# Patient Record
Sex: FEMALE | Race: WHITE | Employment: FULL TIME | URBAN - METROPOLITAN AREA
[De-identification: names, ages, dates, MRNs, and addresses within clinical notes are randomized per-mention and may not be internally consistent; named-entity substitution may affect disease eponyms.]

---

## 2017-05-23 PROBLEM — K21.9 GASTROESOPHAGEAL REFLUX DISEASE, ESOPHAGITIS PRESENCE NOT SPECIFIED: Status: ACTIVE | Noted: 2017-05-23

## 2018-03-20 PROCEDURE — 87624 HPV HI-RISK TYP POOLED RSLT: CPT | Performed by: FAMILY MEDICINE

## 2018-03-20 PROCEDURE — 88175 CYTOPATH C/V AUTO FLUID REDO: CPT | Performed by: FAMILY MEDICINE

## 2018-03-29 PROCEDURE — 87491 CHLMYD TRACH DNA AMP PROBE: CPT | Performed by: FAMILY MEDICINE

## 2018-03-29 PROCEDURE — 80074 ACUTE HEPATITIS PANEL: CPT | Performed by: FAMILY MEDICINE

## 2018-03-29 PROCEDURE — 86780 TREPONEMA PALLIDUM: CPT | Performed by: FAMILY MEDICINE

## 2018-03-29 PROCEDURE — 87591 N.GONORRHOEAE DNA AMP PROB: CPT | Performed by: FAMILY MEDICINE

## 2018-03-29 PROCEDURE — 87389 HIV-1 AG W/HIV-1&-2 AB AG IA: CPT | Performed by: FAMILY MEDICINE

## 2018-03-29 PROCEDURE — 36415 COLL VENOUS BLD VENIPUNCTURE: CPT | Performed by: FAMILY MEDICINE

## 2018-08-22 PROCEDURE — 86618 LYME DISEASE ANTIBODY: CPT | Performed by: FAMILY MEDICINE

## 2018-08-22 PROCEDURE — 84425 ASSAY OF VITAMIN B-1: CPT | Performed by: FAMILY MEDICINE

## 2019-02-25 ENCOUNTER — APPOINTMENT (OUTPATIENT)
Dept: GENERAL RADIOLOGY | Facility: HOSPITAL | Age: 34
End: 2019-02-25
Attending: EMERGENCY MEDICINE
Payer: COMMERCIAL

## 2019-02-25 ENCOUNTER — HOSPITAL ENCOUNTER (EMERGENCY)
Facility: HOSPITAL | Age: 34
Discharge: HOME | End: 2019-02-25
Attending: EMERGENCY MEDICINE
Payer: COMMERCIAL

## 2019-02-25 VITALS
SYSTOLIC BLOOD PRESSURE: 136 MMHG | HEART RATE: 88 BPM | BODY MASS INDEX: 29.55 KG/M2 | DIASTOLIC BLOOD PRESSURE: 96 MMHG | WEIGHT: 195 LBS | RESPIRATION RATE: 15 BRPM | OXYGEN SATURATION: 99 % | HEIGHT: 68 IN | TEMPERATURE: 98 F

## 2019-02-25 DIAGNOSIS — I15.9 SECONDARY HYPERTENSION: ICD-10-CM

## 2019-02-25 DIAGNOSIS — R07.9 CHEST PAIN OF UNCERTAIN ETIOLOGY: Primary | ICD-10-CM

## 2019-02-25 LAB
ALBUMIN SERPL-MCNC: 3.8 G/DL (ref 3.4–5)
ALBUMIN/GLOB SERPL: 1.1 {RATIO} (ref 1–2)
ALP LIVER SERPL-CCNC: 115 U/L (ref 37–98)
ALT SERPL-CCNC: 19 U/L (ref 13–56)
ANION GAP SERPL CALC-SCNC: 9 MMOL/L (ref 0–18)
AST SERPL-CCNC: 16 U/L (ref 15–37)
BASOPHILS # BLD AUTO: 0.04 X10(3) UL (ref 0–0.2)
BASOPHILS NFR BLD AUTO: 0.6 %
BILIRUB SERPL-MCNC: 0.2 MG/DL (ref 0.1–2)
BUN BLD-MCNC: 11 MG/DL (ref 7–18)
BUN/CREAT SERPL: 13.4 (ref 10–20)
CALCIUM BLD-MCNC: 9.6 MG/DL (ref 8.5–10.1)
CHLORIDE SERPL-SCNC: 107 MMOL/L (ref 98–107)
CO2 SERPL-SCNC: 24 MMOL/L (ref 21–32)
CREAT BLD-MCNC: 0.82 MG/DL (ref 0.55–1.02)
D-DIMER: 0.45 UG/ML FEU (ref 0–0.49)
DEPRECATED RDW RBC AUTO: 39.9 FL (ref 35.1–46.3)
EOSINOPHIL # BLD AUTO: 0.07 X10(3) UL (ref 0–0.7)
EOSINOPHIL NFR BLD AUTO: 1 %
ERYTHROCYTE [DISTWIDTH] IN BLOOD BY AUTOMATED COUNT: 11.6 % (ref 11–15)
GLOBULIN PLAS-MCNC: 3.5 G/DL (ref 2.8–4.4)
GLUCOSE BLD-MCNC: 107 MG/DL (ref 70–99)
HCT VFR BLD AUTO: 42.2 % (ref 35–48)
HGB BLD-MCNC: 14.4 G/DL (ref 12–16)
IMM GRANULOCYTES # BLD AUTO: 0.01 X10(3) UL (ref 0–1)
IMM GRANULOCYTES NFR BLD: 0.1 %
LYMPHOCYTES # BLD AUTO: 2.07 X10(3) UL (ref 1–4)
LYMPHOCYTES NFR BLD AUTO: 28.6 %
M PROTEIN MFR SERPL ELPH: 7.3 G/DL (ref 6.4–8.2)
MCH RBC QN AUTO: 32.3 PG (ref 26–34)
MCHC RBC AUTO-ENTMCNC: 34.1 G/DL (ref 31–37)
MCV RBC AUTO: 94.6 FL (ref 80–100)
MONOCYTES # BLD AUTO: 0.35 X10(3) UL (ref 0.1–1)
MONOCYTES NFR BLD AUTO: 4.8 %
NEUTROPHILS # BLD AUTO: 4.7 X10 (3) UL (ref 1.5–7.7)
NEUTROPHILS # BLD AUTO: 4.7 X10(3) UL (ref 1.5–7.7)
NEUTROPHILS NFR BLD AUTO: 64.9 %
OSMOLALITY SERPL CALC.SUM OF ELEC: 290 MOSM/KG (ref 275–295)
PLATELET # BLD AUTO: 269 10(3)UL (ref 150–450)
POTASSIUM SERPL-SCNC: 3.8 MMOL/L (ref 3.5–5.1)
RBC # BLD AUTO: 4.46 X10(6)UL (ref 3.8–5.3)
SODIUM SERPL-SCNC: 140 MMOL/L (ref 136–145)
TROPONIN I SERPL-MCNC: <0.045 NG/ML (ref ?–0.04)
TROPONIN I SERPL-MCNC: <0.045 NG/ML (ref ?–0.04)
WBC # BLD AUTO: 7.2 X10(3) UL (ref 4–11)

## 2019-02-25 PROCEDURE — 85378 FIBRIN DEGRADE SEMIQUANT: CPT | Performed by: EMERGENCY MEDICINE

## 2019-02-25 PROCEDURE — 99285 EMERGENCY DEPT VISIT HI MDM: CPT

## 2019-02-25 PROCEDURE — 93010 ELECTROCARDIOGRAM REPORT: CPT

## 2019-02-25 PROCEDURE — 84484 ASSAY OF TROPONIN QUANT: CPT | Performed by: EMERGENCY MEDICINE

## 2019-02-25 PROCEDURE — 36415 COLL VENOUS BLD VENIPUNCTURE: CPT

## 2019-02-25 PROCEDURE — 80053 COMPREHEN METABOLIC PANEL: CPT | Performed by: EMERGENCY MEDICINE

## 2019-02-25 PROCEDURE — 93005 ELECTROCARDIOGRAM TRACING: CPT

## 2019-02-25 PROCEDURE — 85025 COMPLETE CBC W/AUTO DIFF WBC: CPT | Performed by: EMERGENCY MEDICINE

## 2019-02-25 PROCEDURE — 71045 X-RAY EXAM CHEST 1 VIEW: CPT | Performed by: EMERGENCY MEDICINE

## 2019-02-25 RX ORDER — PANTOPRAZOLE SODIUM 40 MG/1
40 TABLET, DELAYED RELEASE ORAL DAILY
Qty: 30 TABLET | Refills: 0 | Status: SHIPPED | OUTPATIENT
Start: 2019-02-25 | End: 2019-03-27

## 2019-02-25 RX ORDER — ONDANSETRON 2 MG/ML
4 INJECTION INTRAMUSCULAR; INTRAVENOUS ONCE
Status: DISCONTINUED | OUTPATIENT
Start: 2019-02-25 | End: 2019-02-25

## 2019-02-25 RX ORDER — KETOROLAC TROMETHAMINE 30 MG/ML
30 INJECTION, SOLUTION INTRAMUSCULAR; INTRAVENOUS ONCE
Status: DISCONTINUED | OUTPATIENT
Start: 2019-02-25 | End: 2019-02-25

## 2019-02-25 RX ORDER — METOPROLOL SUCCINATE 25 MG/1
25 TABLET, EXTENDED RELEASE ORAL DAILY
Qty: 30 TABLET | Refills: 0 | Status: SHIPPED | OUTPATIENT
Start: 2019-02-25 | End: 2019-03-27

## 2019-02-25 NOTE — ED PROVIDER NOTES
Patient Seen in: [facility] Emergency Department    History   Patient presents with:  Chest Pain Angina (cardiovascular)    Stated Complaint: left chest pain    HPI  This is a 60-year-old female who has a complaints of pain that started 1 hour and a Physical Exam  General: . Patient is in no respiratory distress. The patient is in no respiratory distress. The patient is not septic or toxic    HEENT: Atraumatic, conjunctiva are not pale. There is no icterus. Oral mucosa Is wet.   No facial t WITH PLATELET    Narrative: The following orders were created for panel order CBC WITH DIFFERENTIAL WITH PLATELET.   Procedure                               Abnormality         Status                     ---------                               --------- shortness of breath.   The patient states that she walks a lot and has no chest pain with activity she has no family history 2 EKGs were normal the troponin repeat troponin was done which at time dictation was pending if the troponin second troponin is nega

## 2019-02-25 NOTE — ED NOTES
Pt declined Toradol and zofran at this time. Pt states her symptoms are intermittent. No pain at this time.

## 2019-02-25 NOTE — ED INITIAL ASSESSMENT (HPI)
Pt to ER left upper chest pain today. Pt took 325 mg ASA at home. Pain 4/10, had nitro x 1 en-route and pain was resolved. Michel ODT was given en-route. Pt reports discomfort to left chest now. Pt is tearful and anxious.

## 2019-02-26 LAB
ATRIAL RATE: 101 BPM
ATRIAL RATE: 95 BPM
P AXIS: 21 DEGREES
P AXIS: 51 DEGREES
P-R INTERVAL: 150 MS
P-R INTERVAL: 152 MS
Q-T INTERVAL: 352 MS
Q-T INTERVAL: 354 MS
QRS DURATION: 76 MS
QRS DURATION: 76 MS
QTC CALCULATION (BEZET): 444 MS
QTC CALCULATION (BEZET): 456 MS
R AXIS: 0 DEGREES
R AXIS: 1 DEGREES
T AXIS: 34 DEGREES
T AXIS: 41 DEGREES
VENTRICULAR RATE: 101 BPM
VENTRICULAR RATE: 95 BPM

## 2019-07-29 ENCOUNTER — OFFICE VISIT (OUTPATIENT)
Dept: FAMILY MEDICINE CLINIC | Facility: CLINIC | Age: 34
End: 2019-07-29
Payer: COMMERCIAL

## 2019-07-29 VITALS
BODY MASS INDEX: 29.5 KG/M2 | HEIGHT: 68 IN | WEIGHT: 194.63 LBS | RESPIRATION RATE: 16 BRPM | TEMPERATURE: 98 F | OXYGEN SATURATION: 98 % | DIASTOLIC BLOOD PRESSURE: 74 MMHG | SYSTOLIC BLOOD PRESSURE: 122 MMHG | HEART RATE: 78 BPM

## 2019-07-29 DIAGNOSIS — J02.9 SORE THROAT: Primary | ICD-10-CM

## 2019-07-29 LAB
CONTROL LINE PRESENT WITH A CLEAR BACKGROUND (YES/NO): YES YES/NO
STREP GRP A CUL-SCR: NEGATIVE

## 2019-07-29 PROCEDURE — 87880 STREP A ASSAY W/OPTIC: CPT | Performed by: FAMILY MEDICINE

## 2019-07-29 PROCEDURE — 99202 OFFICE O/P NEW SF 15 MIN: CPT | Performed by: FAMILY MEDICINE

## 2019-07-30 NOTE — PATIENT INSTRUCTIONS
Take claritin/zyrtec once daily. Ibuprofen for pain. If no better in 2-3 days, follow-up for further evaluation.

## 2019-07-30 NOTE — PROGRESS NOTES
CHIEF COMPLAINT:   Patient presents with:  Sore Throat: x 4 days         HPI:   Billy Barbosa is a 36year old female presents to clinic with complaint of sore throat. Patient has had for 4 days.  Patient denies congestion, cough, fever, headache, nause apparent distress  SKIN: no rashes,no suspicious lesions  HEAD: atraumatic, normocephalic  EYES: conjunctivae clear, EOM intact  EARS: TM's clear, non-injected, no bulging, retraction, or fluid bilaterally  NOSE: nostrils patent, no exudates, nasal mucosa

## 2019-09-17 PROCEDURE — 87045 FECES CULTURE AEROBIC BACT: CPT | Performed by: EMERGENCY MEDICINE

## 2019-09-17 PROCEDURE — 87493 C DIFF AMPLIFIED PROBE: CPT | Performed by: EMERGENCY MEDICINE

## 2019-09-17 PROCEDURE — 87046 STOOL CULTR AEROBIC BACT EA: CPT | Performed by: EMERGENCY MEDICINE

## 2020-06-11 PROBLEM — I10 ESSENTIAL HYPERTENSION: Status: ACTIVE | Noted: 2020-06-11

## 2020-12-15 PROBLEM — F41.0 PANIC DISORDER: Status: ACTIVE | Noted: 2020-12-15

## 2020-12-15 PROBLEM — Z63.9 RELATIONSHIP PROBLEMS: Status: ACTIVE | Noted: 2020-12-15

## 2020-12-16 PROBLEM — F33.0 MILD EPISODE OF RECURRENT MAJOR DEPRESSIVE DISORDER (HCC): Status: ACTIVE | Noted: 2020-12-16

## 2020-12-16 PROBLEM — F41.1 GENERALIZED ANXIETY DISORDER: Status: ACTIVE | Noted: 2020-12-16

## 2020-12-16 PROBLEM — F33.0 MILD EPISODE OF RECURRENT MAJOR DEPRESSIVE DISORDER: Status: ACTIVE | Noted: 2020-12-16

## 2020-12-16 PROBLEM — F43.9 STRESS: Status: ACTIVE | Noted: 2020-12-16

## 2021-04-14 PROCEDURE — 81001 URINALYSIS AUTO W/SCOPE: CPT | Performed by: STUDENT IN AN ORGANIZED HEALTH CARE EDUCATION/TRAINING PROGRAM

## 2021-04-14 PROCEDURE — 99285 EMERGENCY DEPT VISIT HI MDM: CPT

## 2021-04-14 PROCEDURE — 96360 HYDRATION IV INFUSION INIT: CPT

## 2021-04-14 PROCEDURE — 81025 URINE PREGNANCY TEST: CPT

## 2021-04-14 PROCEDURE — 81001 URINALYSIS AUTO W/SCOPE: CPT

## 2021-04-14 PROCEDURE — 93010 ELECTROCARDIOGRAM REPORT: CPT

## 2021-04-15 ENCOUNTER — APPOINTMENT (OUTPATIENT)
Dept: CT IMAGING | Facility: HOSPITAL | Age: 36
End: 2021-04-15
Attending: STUDENT IN AN ORGANIZED HEALTH CARE EDUCATION/TRAINING PROGRAM
Payer: COMMERCIAL

## 2021-04-15 ENCOUNTER — HOSPITAL ENCOUNTER (EMERGENCY)
Facility: HOSPITAL | Age: 36
Discharge: HOME | End: 2021-04-15
Attending: STUDENT IN AN ORGANIZED HEALTH CARE EDUCATION/TRAINING PROGRAM
Payer: COMMERCIAL

## 2021-04-15 VITALS
DIASTOLIC BLOOD PRESSURE: 97 MMHG | OXYGEN SATURATION: 99 % | WEIGHT: 185 LBS | RESPIRATION RATE: 17 BRPM | TEMPERATURE: 97 F | HEART RATE: 84 BPM | BODY MASS INDEX: 28.04 KG/M2 | HEIGHT: 68 IN | SYSTOLIC BLOOD PRESSURE: 132 MMHG

## 2021-04-15 DIAGNOSIS — I10 HYPERTENSION, UNSPECIFIED TYPE: ICD-10-CM

## 2021-04-15 DIAGNOSIS — R11.0 NAUSEA: ICD-10-CM

## 2021-04-15 DIAGNOSIS — R51.9 ACUTE NONINTRACTABLE HEADACHE, UNSPECIFIED HEADACHE TYPE: Primary | ICD-10-CM

## 2021-04-15 PROCEDURE — 85025 COMPLETE CBC W/AUTO DIFF WBC: CPT | Performed by: STUDENT IN AN ORGANIZED HEALTH CARE EDUCATION/TRAINING PROGRAM

## 2021-04-15 PROCEDURE — 93005 ELECTROCARDIOGRAM TRACING: CPT

## 2021-04-15 PROCEDURE — 84484 ASSAY OF TROPONIN QUANT: CPT | Performed by: STUDENT IN AN ORGANIZED HEALTH CARE EDUCATION/TRAINING PROGRAM

## 2021-04-15 PROCEDURE — 80053 COMPREHEN METABOLIC PANEL: CPT | Performed by: STUDENT IN AN ORGANIZED HEALTH CARE EDUCATION/TRAINING PROGRAM

## 2021-04-15 PROCEDURE — 70450 CT HEAD/BRAIN W/O DYE: CPT | Performed by: STUDENT IN AN ORGANIZED HEALTH CARE EDUCATION/TRAINING PROGRAM

## 2021-04-15 RX ORDER — ONDANSETRON 4 MG/1
TABLET, ORALLY DISINTEGRATING ORAL
Status: COMPLETED
Start: 2021-04-15 | End: 2021-04-15

## 2021-04-15 RX ORDER — ACETAMINOPHEN 500 MG
1000 TABLET ORAL ONCE
Status: COMPLETED | OUTPATIENT
Start: 2021-04-15 | End: 2021-04-15

## 2021-04-15 RX ORDER — ONDANSETRON 2 MG/ML
INJECTION INTRAMUSCULAR; INTRAVENOUS
Status: DISCONTINUED
Start: 2021-04-15 | End: 2021-04-15 | Stop reason: WASHOUT

## 2021-04-15 RX ORDER — ONDANSETRON 4 MG/1
4 TABLET, ORALLY DISINTEGRATING ORAL ONCE
Status: COMPLETED | OUTPATIENT
Start: 2021-04-15 | End: 2021-04-15

## 2021-04-15 NOTE — ED PROVIDER NOTES
Patient Seen in: [facility] Emergency Department      History   Patient presents with:  Headache  Hypertension    Stated Complaint: htn 135/102, headache     HPI/Subjective:   HPI    Patient is a 36-year-old female who presents emergency department Resp 20   Ht 172.7 cm (5' 8\")   Wt 83.9 kg   LMP 03/31/2021   SpO2 99%   BMI 28.13 kg/m²         Physical Exam    Constitutional: The patient is oriented to person, place, and time, appears well-developed and well-nourished. HENT:   Head: Normocephalic. -----------         ------                     CBC W/ DIFFERENTIAL[[id number]]          Abnormal            Final result                 Please view results for these tests on the individual orders.      EKG    Rate, intervals and axes as noted on (primary encounter diagnosis)  Hypertension, unspecified type  Nausea     Disposition:  Discharge  4/15/2021  2:14 am    Follow-up:  Jerry Melton, [address]  [phone]    In 1 day            Medications Prescribed:

## 2021-04-15 NOTE — ED INITIAL ASSESSMENT (HPI)
Pt states having headache x 2 hours. Pt states her BP was high at 135/102. Pt states taking regular BP medication at 1600 and 2100, with no relief of headache.

## 2025-07-16 ENCOUNTER — APPOINTMENT (OUTPATIENT)
Dept: CT IMAGING | Facility: HOSPITAL | Age: 40
End: 2025-07-16
Attending: EMERGENCY MEDICINE
Payer: COMMERCIAL

## 2025-07-16 ENCOUNTER — HOSPITAL ENCOUNTER (EMERGENCY)
Facility: HOSPITAL | Age: 40
Discharge: HOME | End: 2025-07-16
Attending: EMERGENCY MEDICINE
Payer: COMMERCIAL

## 2025-07-16 VITALS
OXYGEN SATURATION: 100 % | WEIGHT: 195 LBS | TEMPERATURE: 98 F | BODY MASS INDEX: 29.55 KG/M2 | DIASTOLIC BLOOD PRESSURE: 90 MMHG | HEART RATE: 78 BPM | SYSTOLIC BLOOD PRESSURE: 132 MMHG | RESPIRATION RATE: 19 BRPM | HEIGHT: 68 IN

## 2025-07-16 DIAGNOSIS — F41.9 ANXIETY: ICD-10-CM

## 2025-07-16 DIAGNOSIS — R00.2 PALPITATIONS: Primary | ICD-10-CM

## 2025-07-16 LAB
ALBUMIN SERPL-MCNC: 4.7 G/DL (ref 3.2–4.8)
ALBUMIN/GLOB SERPL: 1.6 {RATIO} (ref 1–2)
ALP LIVER SERPL-CCNC: 95 U/L (ref 37–98)
ALT SERPL-CCNC: 17 U/L (ref 10–49)
ANION GAP SERPL CALC-SCNC: 8 MMOL/L (ref 0–18)
AST SERPL-CCNC: 32 U/L (ref ?–34)
ATRIAL RATE: 120 BPM
B-HCG UR QL: NEGATIVE
BASOPHILS # BLD AUTO: 0.04 X10(3) UL (ref 0–0.2)
BASOPHILS NFR BLD AUTO: 0.3 %
BILIRUB SERPL-MCNC: 0.4 MG/DL (ref 0.3–1.2)
BILIRUB UR QL STRIP.AUTO: NEGATIVE
BUN BLD-MCNC: 13 MG/DL (ref 9–23)
CALCIUM BLD-MCNC: 9.6 MG/DL (ref 8.7–10.6)
CHLORIDE SERPL-SCNC: 106 MMOL/L (ref 98–112)
CLARITY UR REFRACT.AUTO: CLEAR
CO2 SERPL-SCNC: 24 MMOL/L (ref 21–32)
COLOR UR AUTO: COLORLESS
CREAT BLD-MCNC: 0.87 MG/DL (ref 0.55–1.02)
D DIMER PPP FEU-MCNC: 0.87 UG/ML FEU (ref ?–0.5)
EGFRCR SERPLBLD CKD-EPI 2021: 87 ML/MIN/1.73M2 (ref 60–?)
EOSINOPHIL # BLD AUTO: 0.02 X10(3) UL (ref 0–0.7)
EOSINOPHIL NFR BLD AUTO: 0.2 %
ERYTHROCYTE [DISTWIDTH] IN BLOOD BY AUTOMATED COUNT: 12 %
GLOBULIN PLAS-MCNC: 3 G/DL (ref 2–3.5)
GLUCOSE BLD-MCNC: 131 MG/DL (ref 70–99)
GLUCOSE UR STRIP.AUTO-MCNC: NORMAL MG/DL
HCT VFR BLD AUTO: 43.1 % (ref 35–48)
HGB BLD-MCNC: 15.2 G/DL (ref 12–16)
IMM GRANULOCYTES # BLD AUTO: 0.03 X10(3) UL (ref 0–1)
IMM GRANULOCYTES NFR BLD: 0.2 %
KETONES UR STRIP.AUTO-MCNC: NEGATIVE MG/DL
LEUKOCYTE ESTERASE UR QL STRIP.AUTO: NEGATIVE
LYMPHOCYTES # BLD AUTO: 1.33 X10(3) UL (ref 1–4)
LYMPHOCYTES NFR BLD AUTO: 10.2 %
MCH RBC QN AUTO: 33 PG (ref 26–34)
MCHC RBC AUTO-ENTMCNC: 35.3 G/DL (ref 31–37)
MCV RBC AUTO: 93.5 FL (ref 80–100)
MONOCYTES # BLD AUTO: 0.29 X10(3) UL (ref 0.1–1)
MONOCYTES NFR BLD AUTO: 2.2 %
NEUTROPHILS # BLD AUTO: 11.27 X10 (3) UL (ref 1.5–7.7)
NEUTROPHILS # BLD AUTO: 11.27 X10(3) UL (ref 1.5–7.7)
NEUTROPHILS NFR BLD AUTO: 86.9 %
NITRITE UR QL STRIP.AUTO: NEGATIVE
OSMOLALITY SERPL CALC.SUM OF ELEC: 288 MOSM/KG (ref 275–295)
P AXIS: 54 DEGREES
P-R INTERVAL: 162 MS
PH UR STRIP.AUTO: 5.5 [PH] (ref 5–8)
PLATELET # BLD AUTO: 286 10(3)UL (ref 150–450)
POTASSIUM SERPL-SCNC: 4.9 MMOL/L (ref 3.5–5.1)
PROT SERPL-MCNC: 7.7 G/DL (ref 5.7–8.2)
PROT UR STRIP.AUTO-MCNC: NEGATIVE MG/DL
Q-T INTERVAL: 326 MS
QRS DURATION: 72 MS
QTC CALCULATION (BEZET): 460 MS
R AXIS: -1 DEGREES
RBC # BLD AUTO: 4.61 X10(6)UL (ref 3.8–5.3)
RBC UR QL AUTO: NEGATIVE
SODIUM SERPL-SCNC: 138 MMOL/L (ref 136–145)
SP GR UR STRIP.AUTO: <1.005 (ref 1–1.03)
T AXIS: 41 DEGREES
TROPONIN I SERPL HS-MCNC: 12 NG/L (ref ?–34)
TSI SER-ACNC: 4.19 UIU/ML (ref 0.55–4.78)
UROBILINOGEN UR STRIP.AUTO-MCNC: NORMAL MG/DL
VENTRICULAR RATE: 120 BPM
WBC # BLD AUTO: 13 X10(3) UL (ref 4–11)

## 2025-07-16 PROCEDURE — 80053 COMPREHEN METABOLIC PANEL: CPT | Performed by: EMERGENCY MEDICINE

## 2025-07-16 PROCEDURE — 84443 ASSAY THYROID STIM HORMONE: CPT | Performed by: EMERGENCY MEDICINE

## 2025-07-16 PROCEDURE — 84484 ASSAY OF TROPONIN QUANT: CPT | Performed by: EMERGENCY MEDICINE

## 2025-07-16 PROCEDURE — 81025 URINE PREGNANCY TEST: CPT

## 2025-07-16 PROCEDURE — 71275 CT ANGIOGRAPHY CHEST: CPT | Performed by: EMERGENCY MEDICINE

## 2025-07-16 PROCEDURE — 99284 EMERGENCY DEPT VISIT MOD MDM: CPT

## 2025-07-16 PROCEDURE — 85025 COMPLETE CBC W/AUTO DIFF WBC: CPT | Performed by: EMERGENCY MEDICINE

## 2025-07-16 PROCEDURE — 81003 URINALYSIS AUTO W/O SCOPE: CPT | Performed by: EMERGENCY MEDICINE

## 2025-07-16 PROCEDURE — 85379 FIBRIN DEGRADATION QUANT: CPT | Performed by: EMERGENCY MEDICINE

## 2025-07-16 PROCEDURE — 96374 THER/PROPH/DIAG INJ IV PUSH: CPT

## 2025-07-16 PROCEDURE — 93010 ELECTROCARDIOGRAM REPORT: CPT

## 2025-07-16 PROCEDURE — 96361 HYDRATE IV INFUSION ADD-ON: CPT

## 2025-07-16 PROCEDURE — 93005 ELECTROCARDIOGRAM TRACING: CPT

## 2025-07-16 PROCEDURE — 99285 EMERGENCY DEPT VISIT HI MDM: CPT

## 2025-07-16 RX ORDER — ALPRAZOLAM 0.5 MG
0.5 TABLET ORAL ONCE
Status: COMPLETED | OUTPATIENT
Start: 2025-07-16 | End: 2025-07-16

## 2025-07-16 RX ORDER — LEVOTHYROXINE SODIUM 50 UG/1
50 TABLET ORAL
COMMUNITY

## 2025-07-16 RX ORDER — ONDANSETRON 2 MG/ML
4 INJECTION INTRAMUSCULAR; INTRAVENOUS ONCE
Status: COMPLETED | OUTPATIENT
Start: 2025-07-16 | End: 2025-07-16

## 2025-07-16 RX ORDER — QUETIAPINE FUMARATE 50 MG/1
50 TABLET, FILM COATED ORAL AS NEEDED
COMMUNITY

## 2025-07-16 RX ORDER — ROSUVASTATIN CALCIUM 5 MG/1
5 TABLET, COATED ORAL NIGHTLY
COMMUNITY

## 2025-07-16 NOTE — ED QUICK NOTES
Rounding Completed    Plan of Care reviewed. Waiting for discharge to home.    Bed is locked and in lowest position. Call light within reach, visitor at bedside.

## 2025-07-16 NOTE — ED PROVIDER NOTES
Patient Seen in: [facility] Emergency Department        History  Chief Complaint   Patient presents with    Arrythmia/Palpitations     Stated Complaint: pt states chest is sore and heart is pounding with hypertension and anxiety    Subjective:   HPI            Patient is a 40-year-old female presenting to the ED with palpitations and anxiety.  The patient states she feels like her heart is racing which is making her chest feel sore.  She has no chest pain or shortness of breath.  She has no nausea or vomiting.  No diaphoresis.  This started after she had a panic attack.  Patient also reports she was recently on metoprolol but discontinued this within the last couple of weeks because it was causing low blood pressure.  She also took hydroxyzine this evening as well.  She did end up taking a dose of her metoprolol prior to arrival.  No history of thyroid disease.  No history of DVT or PE.  The patient does smoke.  History of dyslipidemia and hypertension.  No recent exertional symptoms.  No recent decrease in oral intake.  No diarrhea.  Soreness to the chest is quite mild.  No birth control.  No extremity edema.  No recent surgery or travel history.  No hemoptysis.  No fevers or chills.  No cough or URI symptoms.  No associated urinary complaints.  Patient does have a known history of anxiety      Objective:     No pertinent past medical history.            No pertinent past surgical history.              No pertinent social history.                              Physical Exam    ED Triage Vitals [07/16/25 0248]   BP (!) 145/99   Pulse 120   Resp 22   Temp 97.6 °F (36.4 °C)   Temp src Temporal   SpO2 100 %   O2 Device None (Room air)       Current Vitals:   Vital Signs  BP: (!) 145/99  Pulse: 103  Resp: 19  Temp: 97.6 °F (36.4 °C)  Temp src: Temporal    Oxygen Therapy  SpO2: 100 %  O2 Device: None (Room air)            Physical Exam  Vitals and nursing note reviewed.   Constitutional:       General: She is not  in acute distress.     Appearance: Normal appearance. She is well-developed. She is not ill-appearing or toxic-appearing.   HENT:      Head: Normocephalic and atraumatic.      Right Ear: External ear normal.      Left Ear: External ear normal.      Mouth/Throat:      Mouth: Mucous membranes are moist.      Pharynx: Oropharynx is clear.   Eyes:      Conjunctiva/sclera: Conjunctivae normal.   Cardiovascular:      Rate and Rhythm: Regular rhythm. Tachycardia present.      Heart sounds: Normal heart sounds.   Pulmonary:      Effort: Pulmonary effort is normal.      Breath sounds: Normal breath sounds.   Abdominal:      General: Abdomen is flat. Bowel sounds are normal. There is no distension.      Tenderness: There is no abdominal tenderness.   Musculoskeletal:      Right lower leg: No edema.      Left lower leg: No edema.   Skin:     General: Skin is warm.      Capillary Refill: Capillary refill takes less than 2 seconds.      Findings: No rash.   Neurological:      Mental Status: She is alert.   Psychiatric:         Mood and Affect: Mood normal.         Behavior: Behavior normal.                 ED Course  Labs Reviewed   CBC WITH DIFFERENTIAL WITH PLATELET - Abnormal; Notable for the following components:       Result Value    WBC 13.0 (*)     Neutrophil Absolute Prelim 11.27 (*)     Neutrophil Absolute 11.27 (*)     All other components within normal limits   COMP METABOLIC PANEL (14) - Abnormal; Notable for the following components:    Glucose 131 (*)     All other components within normal limits   URINALYSIS WITH CULTURE REFLEX - Abnormal; Notable for the following components:    Urine Color Colorless (*)     Spec Gravity <1.005 (*)     All other components within normal limits   D-DIMER - Abnormal; Notable for the following components:    D-Dimer 0.87 (*)     All other components within normal limits   TROPONIN I HIGH SENSITIVITY - Normal   TSH W REFLEX TO FREE T4 - Normal   POCT PREGNANCY URINE - Normal    RAINBOW DRAW LAVENDER   RAINBOW DRAW LIGHT GREEN   RAINBOW DRAW BLUE     EKG    Rate, intervals and axes as noted on EKG Report.  Rate: 120  Rhythm: Sinus tachycardia  Reading: Rate has increased when compared to previous EKG from April 15, 2021                                MDM     History obtained from patient.     Differential diagnosis includes dehydration, cardiac dysrhythmia, electrolyte disturbance, hyperthyroidism, PE, anxiety.  Patient does not have a fever that would cause tachycardia.    Previous records reviewed.  Last office visit 2/11/2025.  Patient was feeling tired.  There was concern for low blood pressure.  Her BP at that time was 100/76.  Patient's metoprolol was held and hydration was advised.    Testing considered and ordered includes EKG, CBC, CMP, troponin, D-dimer.    I reviewed all results.  CBC reviewed with leukocytosis and WBC of 13 with slight neutrophilic shift.  Although patient is tachycardic she is afebrile and has not had a fever prior to arrival.  Her UA is negative.  UCG is negative.  CMP reviewed and is unremarkable.  Her troponin is negative.  TSH is also normal.  Chest x-ray was considered but D-dimer was ordered.  If positive, CTA will be ordered instead of a chest x-ray.      I also reviewed the official report which shows   CTA CHEST (CPT=71275)  Result Date: 7/16/2025  PROCEDURE: CTA CHEST (CPT=71275) INDICATIONS: pt states chest is sore and heart is pounding with hypertension and anxiety PATIENT STATED HISTORY: None COMPARISON: No comparisons. TECHNIQUE: IV contrast-enhanced multislice CT angiography is performed through the pulmonary arterial anatomy. 3D volume renderings are generated. Dose reduction techniques were used. Dose information is transmitted to the ACR (American College of Radiology) NRDR (National Radiology Data Registry) which includes the Dose Index Registry. CONTRAST: IOPAMIDOL 76% IV SOLN FOR POWER INJECTOR:85 mL FINDINGS: VASCULATURE:  No visible  pulmonary arterial thrombus or attenuation. THORACIC AORTA:  Unremerkable LUNGS:  No visible pulmonary disease. ELMER:  No mass or adenopathy. MEDIASTINUM:  No mass or adenopathy. CARDIAC:  No enlargement, pericardial thickening, or significant coronary artery calcification. PLEURA:  No mass or effusion. CHEST WALL:  No mass or axillary adenopathy. LIMITED ABDOMEN:  Limited images of the upper abdomen are unremarkable. BONES:  No bony lesion or fracture.      CONCLUSION: Unremarkable contrast-enhanced CT of chest. Electronically Verified and Signed by Attending Radiologist: Adam Hernandez MD 7/16/2025 6:34 AM Workstation: [id number]  '      Interventions in care included IV fluids as well as Zofran and Xanax.    Heart rate did decrease to 82 beats per minutes on the cardiac monitor while patient was sleeping.  While awake, she does become tachycardic.      Overall improvement in symptoms.  The patient feels less anxious, and feels like things have slowed down.  Will plan for discharge with outpatient follow-up.  Patient has been seen by cardiology in the past.  Recommend outpatient follow-up for further evaluation of tachycardia although this could all be secondary to anxiety or recent discontinuation of metoprolol.  Return to ED if symptoms worsen, persist, or new symptoms develop.    Medical Decision Making      Disposition and Plan     Clinical Impression:  1. Palpitations    2. Anxiety         Disposition:  Discharge  7/16/2025  7:23 am    Follow-up:  Behzad Angelo MD  [address]  [place] [address]  [phone]    Schedule an appointment as soon as possible for a visit in 2 day(s)      [facility] Emergency Department  [address] [place] [address]  [phone]  Follow up  IF SYMPTOMS WORSEN, PERSIST, OR NEW SYMPTOMS Maximo Castellano MD  [address]  [place] [address]  [phone]    Schedule an appointment as soon as possible for a  visit in 2 day(s)            Medications Prescribed:  Current Discharge Medication List                Supplementary Documentation:

## 2025-07-16 NOTE — ED INITIAL ASSESSMENT (HPI)
Pt c/o high BP, 152/110 at home, HR in the 130's at home. States she took Metoprolol, Hydroxyzine for anxiety with no relief. Reports \"chest is sore from my heart pounding.\"

## 2025-07-16 NOTE — ED QUICK NOTES
Patient reports feeling better than when arrived, ok to be discharged to home with close follow up at this time

## (undated) NOTE — ED AVS SNAPSHOT
Elio Mcduffie   MRN: [id number]    Department:  [facility] Emergency Department   Date of Visit:  2/25/2019           Disclosure     Insurance plans vary and the physician(s) referred by the ER may not be covered by your plan.  Please contact you tell this physician (or your personal doctor if your instructions are to return to your personal doctor) about any new or lasting problems. The primary care or specialist physician will see patients referred from the [facility] Emergency Department.  Gibson Rojas